# Patient Record
Sex: MALE
[De-identification: names, ages, dates, MRNs, and addresses within clinical notes are randomized per-mention and may not be internally consistent; named-entity substitution may affect disease eponyms.]

---

## 2018-04-03 ENCOUNTER — HOSPITAL ENCOUNTER (OUTPATIENT)
Dept: HOSPITAL 56 - MW.ED | Age: 38
Setting detail: OBSERVATION
LOS: 2 days | Discharge: HOME | End: 2018-04-05
Attending: INTERNAL MEDICINE | Admitting: INTERNAL MEDICINE
Payer: COMMERCIAL

## 2018-04-03 DIAGNOSIS — Z79.899: ICD-10-CM

## 2018-04-03 DIAGNOSIS — R07.9: ICD-10-CM

## 2018-04-03 DIAGNOSIS — Z79.01: ICD-10-CM

## 2018-04-03 DIAGNOSIS — Z87.891: ICD-10-CM

## 2018-04-03 DIAGNOSIS — I26.99: Primary | ICD-10-CM

## 2018-04-03 DIAGNOSIS — Z79.82: ICD-10-CM

## 2018-04-03 PROCEDURE — 96365 THER/PROPH/DIAG IV INF INIT: CPT

## 2018-04-03 PROCEDURE — G0378 HOSPITAL OBSERVATION PER HR: HCPCS

## 2018-04-03 PROCEDURE — 96375 TX/PRO/DX INJ NEW DRUG ADDON: CPT

## 2018-04-03 PROCEDURE — 85610 PROTHROMBIN TIME: CPT

## 2018-04-03 PROCEDURE — 80048 BASIC METABOLIC PNL TOTAL CA: CPT

## 2018-04-03 PROCEDURE — 36415 COLL VENOUS BLD VENIPUNCTURE: CPT

## 2018-04-03 PROCEDURE — 84484 ASSAY OF TROPONIN QUANT: CPT

## 2018-04-03 PROCEDURE — 80053 COMPREHEN METABOLIC PANEL: CPT

## 2018-04-03 PROCEDURE — 85025 COMPLETE CBC W/AUTO DIFF WBC: CPT

## 2018-04-03 PROCEDURE — 96376 TX/PRO/DX INJ SAME DRUG ADON: CPT

## 2018-04-03 PROCEDURE — 93005 ELECTROCARDIOGRAM TRACING: CPT

## 2018-04-03 PROCEDURE — 99285 EMERGENCY DEPT VISIT HI MDM: CPT

## 2018-04-03 PROCEDURE — 85730 THROMBOPLASTIN TIME PARTIAL: CPT

## 2018-04-03 PROCEDURE — 71275 CT ANGIOGRAPHY CHEST: CPT

## 2018-04-04 LAB
CHLORIDE SERPL-SCNC: 103 MMOL/L (ref 98–107)
CHLORIDE SERPL-SCNC: 103 MMOL/L (ref 98–107)
SODIUM SERPL-SCNC: 138 MMOL/L (ref 136–148)
SODIUM SERPL-SCNC: 138 MMOL/L (ref 136–148)

## 2018-04-04 RX ADMIN — HEPARIN SODIUM SCH MLS/HR: 5000 INJECTION, SOLUTION INTRAVENOUS at 03:21

## 2018-04-04 RX ADMIN — HEPARIN SODIUM SCH MLS/HR: 5000 INJECTION, SOLUTION INTRAVENOUS at 13:33

## 2018-04-04 RX ADMIN — ENOXAPARIN SODIUM SCH MG: 150 INJECTION SUBCUTANEOUS at 20:23

## 2018-04-04 NOTE — EDM.PDOC
ED HPI GENERAL MEDICAL PROBLEM





- General


Chief Complaint: Respiratory Problem


Stated Complaint: SHORTNESS OF BREATH


Time Seen by Provider: 04/03/18 23:56





- History of Present Illness


INITIAL COMMENTS - FREE TEXT/NARRATIVE: 





HISTORY AND PHYSICAL:





History of present illness:


The patient is a 39 y/o male with a history of tobacco use but quit 8 months 

ago and presents with complaints of a stabbing like pain in his lateral and 

posterior left chest wall area that started approximately 24 hours ago. The 

patient had recent surgery on his left tibia in [place] on March 10, 2018 and 

says that from that perspective he is doing well with very minimal leg pain and 

he has not noticed any incredible swelling to the left leg or calf tenderness. 

The patient says he has been increasing his activities and doing more and since 

yesterday evening he was having this intermittent stabbing like pain in his 

lateral and posterior left rib cage area. He was not short of breath and he had 

no cough fever chills nausea or vomiting. He does not have any flank pain or 

urinary complaints. He says that it was intermittent and seemed to initially 

only occur with certain movements or deep breaths but now he saying that it is 

been more consistent and every time he takes a deep breath he feels it and it 

is becoming more constant not just with certain movements. He has been eating 

and drinking normally and he has not taken anything extra for pain regarding 

this. He is concerned about a blood clot due to his surgery which is why he's 

here. He says he doesn't feel particularly short of breath but is more 

discomfort and he does not feel like he can take a deep breath.





Review of systems: 


As per history of present illness and below otherwise all systems reviewed and 

negative.





Past medical history: 


As per history of present illness and as reviewed below otherwise 

noncontributory.





Surgical history: 


As per history of present illness and as reviewed below otherwise 

noncontributory.





Social history: 


No reported history of drug or alcohol abuse.





Family history: 


As per history of present illness and as reviewed below otherwise 

noncontributory.





Physical exam:


General: Well-developed well-nourished man who is nontoxic and vital signs are 

noted by me. He is not breathless on my evaluation he is not diaphoretic and 

his heart rate is normal on my personal evaluation.


HEENT: Atraumatic, normocephalic, pupils reactive, negative for conjunctival 

pallor or scleral icterus, mucous membranes moist, throat clear, neck supple, 

nontender, trachea midline.


Lungs: Clear to auscultation, breath sounds equal bilaterally, chest nontender. 

There is no discrete area of tenderness crepitus defects or deformities and I 

cannot elicit the pain on palpation. There is no work of breathing wheezing or 

stridor


Heart: S1S2, regular rate and rhythm no overt tremors on my evaluation


Abdomen: Soft, nondistended, nontender. Negative for masses or 

hepatosplenomegaly. Negative for costovertebral tenderness.


Pelvis: Stable nontender.


Genitourinary: Deferred.


Rectal: Deferred.


Extremities: Atraumatic, negative for cords or calf pain. Neurovascular 

unremarkable. The patient has well-healed scars near his inferior left knee and 

the left calf is slightly larger than the right but there is no discrete calf 

tenderness or  erythema


Neuro: Awake, alert, oriented. Cranial nerves II through XII unremarkable. 

Cerebellum unremarkable. Motor and sensory unremarkable throughout. Exam 

nonfocal.





Diagnostics:


CBC CMP EKG troponin CTA of the chest





Therapeutics:


Oxygen as needed Toradol IV placement heparin per protocol





0300: Testing results were discussed with the patient, family at bedside, and 

our hospitalist Dr. Juan. Due to the bilateral PE we will start a heparin drip 

per protocol with a heparin bolus per Dr. Juan's request. Patient says his 

pain is manageable at this point and does not want further pain treatment. His 

oxygen level is stable but he is placed on low-dose oxygen for comfort. Patient 

continues not to be tachypnic or tachycardic. We'll plan on observation 

admission for Dr. Juan's request





Impression: 


Bilateral PE with possible small left lower lobe pulmonary infarct, history of 

recent tibial surgery stable





Definitive disposition and diagnosis as appropriate pending reevaluation and 

review of above.


  ** left rib area


Pain Score (Numeric/FACES): 6





- Related Data


 Allergies











Allergy/AdvReac Type Severity Reaction Status Date / Time


 


No Known Allergies Allergy   Verified 04/03/18 23:53











Home Meds: 


 Home Meds





Aspirin 325 mg PO DAILY 04/03/18 [History]


Hydrocodone/Acetaminophen [Hydrocodon-Acetaminophn ] 1 tab PO ASDIRECTED 

PRN 04/03/18 [History]











Past Medical History


HEENT History: Reports: None


Cardiovascular History: Reports: None


Respiratory History: Reports: None


Gastrointestinal History: Reports: None


Genitourinary History: Reports: None


Musculoskeletal History: Reports: None


Neurological History: Reports: None


Psychiatric History: Reports: None


Endocrine/Metabolic History: Reports: None


Hematologic History: Reports: None


Immunologic History: Reports: None


Oncologic (Cancer) History: Reports: None


Dermatologic History: Reports: None





- Infectious Disease History


Infectious Disease History: Reports: Chicken Pox





- Past Surgical History


Musculoskeletal Surgical History: Reports: ORIF





Social & Family History





- Family History


Family Medical History: Noncontributory





- Tobacco Use


Smoking Status *Q: Former Smoker


Used Tobacco, but Quit: No





- Caffeine Use


Caffeine Use: Reports: Coffee, Soda





- Recreational Drug Use


Recreational Drug Use: No





ED ROS GENERAL





- Review of Systems


Review Of Systems: ROS reveals no pertinent complaints other than HPI.





ED EXAM, GENERAL





- Physical Exam


Exam: See Below (See dictation)





Course





- Vital Signs


Last Recorded V/S: 


 Last Vital Signs











Temp  36.5 C   04/03/18 23:55


 


Pulse  94   04/04/18 02:43


 


Resp  20   04/04/18 02:43


 


BP  105/66   04/04/18 02:43


 


Pulse Ox  98   04/04/18 02:43














- Orders/Labs/Meds


Orders: 


 Active Orders 24 hr











 Category Date Time Status


 


 Patient Status [ADT] Stat ADT  04/04/18 03:06 Ordered


 


 EKG Documentation Completion [RC] STAT Care  04/04/18 00:08 Active


 


 Oxygen Therapy, ED [RC] ASDIRECTED Care  04/04/18 00:08 Active


 


 Pulse Oximetry [RC] ASDIRECTED Care  04/04/18 00:08 Active


 


 Ang Chest [CT] Stat Exams  04/04/18 00:08 Taken


 


 Heparin Sod,Pork In 0.45% Nacl [Heparin-1/2Ns 25,000 Med  04/04/18 03:15 

Ordered





 Units/500]   





 25,000 unit in 500 ml IV TITRATE   


 


 Heparin Sodium Med  04/04/18 03:04 Once





 5,000 units IVPUSH .BOLUS ONE   


 


 Sodium Chloride 0.9% [Saline Flush] Med  04/04/18 00:08 Active





 10 ml FLUSH ASDIRECTED PRN   


 


 Sodium Chloride 0.9% [Saline Flush] Med  04/04/18 00:08 Active





 2.5 ml FLUSH ASDIRECTED PRN   


 


 Saline Lock Insert [OM.PC] Stat Oth  04/04/18 00:08 Ordered








 Medication Orders





Sodium Chloride (Saline Flush)  10 ml FLUSH ASDIRECTED PRN


   PRN Reason: Keep Vein Open


   Last Admin: 04/04/18 00:36  Dose: 10 ml


Sodium Chloride (Saline Flush)  2.5 ml FLUSH ASDIRECTED PRN


   PRN Reason: Keep Vein Open


   Last Admin: 04/04/18 00:35  Dose: 2.5 ml








Labs: 


 Laboratory Tests











  04/04/18 04/04/18 Range/Units





  00:30 00:30 


 


WBC  13.18 H   (4.0-11.0)  K/uL


 


RBC  4.67   (4.50-5.90)  M/uL


 


Hgb  13.9   (13.0-17.0)  g/dL


 


Hct  39.6   (38.0-50.0)  %


 


MCV  84.8   (80.0-98.0)  fL


 


MCH  29.8   (27.0-32.0)  pg


 


MCHC  35.1   (31.0-37.0)  g/dL


 


RDW Std Deviation  37.5   (28.0-62.0)  fl


 


RDW Coeff of Jairon  12   (11.0-15.0)  %


 


Plt Count  334   (150-400)  K/uL


 


MPV  9.00   (7.40-12.00)  fL


 


Neut % (Auto)  76.8   (48.0-80.0)  %


 


Lymph % (Auto)  14.0 L   (16.0-40.0)  %


 


Mono % (Auto)  7.7   (0.0-15.0)  %


 


Eos % (Auto)  1.3   (0.0-7.0)  %


 


Baso % (Auto)  0.2   (0.0-1.5)  %


 


Neut # (Auto)  10.1 H   (1.4-5.7)  K/uL


 


Lymph # (Auto)  1.8   (0.6-2.4)  K/uL


 


Mono # (Auto)  1.0 H   (0.0-0.8)  K/uL


 


Eos # (Auto)  0.2   (0.0-0.7)  K/uL


 


Baso # (Auto)  0.0   (0.0-0.1)  K/uL


 


Nucleated RBC %  0.0   /100WBC


 


Nucleated RBCs #  0   K/uL


 


Sodium   138  (136-148)  mmol/L


 


Potassium   4.5  (3.5-5.1)  mmol/L


 


Chloride   103  ()  mmol/L


 


Carbon Dioxide   29.4  (21.0-32.0)  mmol/L


 


BUN   19 H  (7.0-18.0)  mg/dL


 


Creatinine   1.1  (0.8-1.3)  mg/dL


 


Est Cr Clr Drug Dosing   110.96  mL/min


 


Estimated GFR (MDRD)   > 60.0  ml/min


 


Glucose   101  ()  mg/dL


 


Calcium   9.4  (8.5-10.1)  mg/dL


 


Total Bilirubin   0.6  (0.2-1.0)  mg/dL


 


AST   12 L  (15-37)  IU/L


 


ALT   13 L  (14-63)  IU/L


 


Alkaline Phosphatase   143 H  ()  U/L


 


Troponin I   < 0.050  (0.000-0.056)  ng/mL


 


Total Protein   7.1  (6.4-8.2)  g/dL


 


Albumin   3.5  (3.4-5.0)  g/dL


 


Globulin   3.6 H  (2.0-3.5)  g/dL


 


Albumin/Globulin Ratio   1.0 L  (1.3-2.8)  











Meds: 


Medications











Generic Name Dose Route Start Last Admin





  Trade Name Freq  PRN Reason Stop Dose Admin


 


Sodium Chloride  10 ml  04/04/18 00:08  04/04/18 00:36





  Saline Flush  FLUSH   10 ml





  ASDIRECTED PRN   Administration





  Keep Vein Open   





     





     





     


 


Sodium Chloride  2.5 ml  04/04/18 00:08  04/04/18 00:35





  Saline Flush  FLUSH   2.5 ml





  ASDIRECTED PRN   Administration





  Keep Vein Open   





     





     





     














Discontinued Medications














Generic Name Dose Route Start Last Admin





  Trade Name Freq  PRN Reason Stop Dose Admin


 


Iopamidol  50 ml  04/04/18 02:20  04/04/18 02:21





  Isovue-370 (76%)  IV  04/04/18 02:21  50 ml





  ONETIME ONE   Administration





     





     





     





     


 


Ketorolac Tromethamine  30 mg  04/04/18 00:09  04/04/18 00:41





  Toradol  IVPUSH  04/04/18 00:10  30 mg





  ONETIME ONE   Administration





     





     





     





     














Departure





- Departure


Time of Disposition: 03:09


Disposition: Refer to Observation


Condition: Good


Clinical Impression: 


Pulmonary embolus


Qualifiers:


 Pulmonary embolism type: other Chronicity: acute Acute cor pulmonale presence: 

without acute cor pulmonale Qualified Code(s): I26.99 - Other pulmonary 

embolism without acute cor pulmonale








- Discharge Information


Referrals: 


PCP,None [Primary Care Provider] - 


Forms:  ED Department Discharge





- My Orders


Last 24 Hours: 


My Active Orders





04/04/18 00:08


EKG Documentation Completion [RC] STAT 


Oxygen Therapy, ED [RC] ASDIRECTED 


Pulse Oximetry [RC] ASDIRECTED 


Ang Chest [CT] Stat 


Sodium Chloride 0.9% [Saline Flush]   10 ml FLUSH ASDIRECTED PRN 


Sodium Chloride 0.9% [Saline Flush]   2.5 ml FLUSH ASDIRECTED PRN 


Saline Lock Insert [OM.PC] Stat 





04/04/18 03:04


Heparin Sodium   5,000 units IVPUSH .BOLUS ONE 





04/04/18 03:06


Patient Status [ADT] Stat 





04/04/18 03:15


Heparin Sod,Pork In 0.45% Nacl [Heparin-1/2Ns 25,000 Units/500] 25,000 unit in 

500 ml IV TITRATE 














- Assessment/Plan


Last 24 Hours: 


My Active Orders





04/04/18 00:08


EKG Documentation Completion [RC] STAT 


Oxygen Therapy, ED [RC] ASDIRECTED 


Pulse Oximetry [RC] ASDIRECTED 


Ang Chest [CT] Stat 


Sodium Chloride 0.9% [Saline Flush]   10 ml FLUSH ASDIRECTED PRN 


Sodium Chloride 0.9% [Saline Flush]   2.5 ml FLUSH ASDIRECTED PRN 


Saline Lock Insert [OM.PC] Stat 





04/04/18 03:04


Heparin Sodium   5,000 units IVPUSH .BOLUS ONE 





04/04/18 03:06


Patient Status [ADT] Stat 





04/04/18 03:15


Heparin Sod,Pork In 0.45% Nacl [Heparin-1/2Ns 25,000 Units/500] 25,000 unit in 

500 ml IV TITRATE

## 2018-04-04 NOTE — CT
EXAM DATE: 18



PATIENT'S AGE: 38





Patient: PAULIE DIAZ



Facility: [place], [place]

Patient ID: [id number]

Site Patient ID: [id number].

Site Accession #: [id number].

: 1980

Study: CT Chest Angio [id number]/4/2018 2:25:31 AM

Ordering Physician: Celestina Knutson



Final Report: 

INDICATION:

Left chest pain, recent surgery 



TECHNIQUE:

CT chest pulmonary PE protocol acquired with 50 cc Isovue 370 IV contrast.



COMPARISON:

None 



FINDINGS:

Cardiovascular structures: There are pulmonary emboli within the segmental and 
subsegmental branches of the bilateral lower lobes and segmental branches of 
the right upper lobe. No evidence for right heart strain. Heart size is normal. 
No sign of aneurysm in the thoracic aorta. 

Mediastinum and tenzin: No mass or adenopathy. 

Lungs: There is some peripheral, pleural-based airspace opacity in the left 
lower lobe. 

Pleura and pericardium: Small left pleural effusion. 

Chest wall and axilla: No mass or adenopathy. 

Upper abdomen: Unremarkable. 

Bones: No significant findings. 



IMPRESSION:

Bilateral pulmonary emboli. No evidence of right heart strain. Possible left 
lower lobe pulmonary infarction. Small left pleural effusion. These findings 
were discussed with Dr. Oscar at 2:50 a.m. on 2018.



Please note that all CT scans at this facility use dose modulation, iterative 
reconstruction, and/or weight-based dosing when appropriate to reduce radiation 
dose to as low as reasonably achievable.



Dictated by Autumn Baker MD @ 2018 2:39AM

(Electronic Signature)



Report Signed by Proxy.
DAVID

## 2018-04-04 NOTE — PCM.HP
H&P History of Present Illness





- General


Date of Service: 04/04/18


Admit Problem/Dx: 


 Admission Diagnosis/Problem





Admission Diagnosis/Problem      Pulmonary embolism











- History of Present Illness


Initial Comments - Free Text/Narative: 


38-year-old male presenting secondary to pulmonary embolism. Patient 3 weeks 

prior had a left tib-fib surgical intervention after traumatic patient did not 

have any complications with the surgery, patient did well in terms of recovery 

after the surgery, his pain has been adequately controlled and he no longer 

required narcotics about a week and a half after his surgery was finished. 

Patient had a surgical intervention in [place]. He recently about one week 

prior came to [place]. He did not have any issues in terms of shortness of 

breath any signs or symptoms of clotting or calf pain throughout this process. 

Patient states that yesterday he even went walking around in [facility] and did 

not have any concerns during the afternoon however towards the evening he 

started to have right-sided calf pain in particular on flexion of his heel. 

Patient subsequently started to develop shortness of breath and left-sided 

anterior chest pain radiating to the back. Patient's shortness of breath became 

progressively worse and he came into the ER where he was subsequently found to 

have a PE. Patient denies any significant past medical history, denies any 

medication aside from that which was prescribed to him by orthopedic surgery 

upon discharge for pain control, patient denies any family history or any 

personal history of any coagulopathy.





  ** left rib area


Pain Score (Numeric/FACES): 5





- Related Data


Allergies/Adverse Reactions: 


 Allergies











Allergy/AdvReac Type Severity Reaction Status Date / Time


 


No Known Allergies Allergy   Verified 04/03/18 23:53











Home Medications: 


 Home Meds





Aspirin 325 mg PO DAILY 04/03/18 [History]


Hydrocodone/Acetaminophen [Hydrocodon-Acetaminophn ] 1 tab PO ASDIRECTED 

PRN 04/03/18 [History]


Enoxaparin [Lovenox] 130 mg SUBCUT Q12H 5 Days #10 syringe 04/04/18 [Rx]











Past Medical History


HEENT History: Reports: None


Cardiovascular History: Reports: None


Respiratory History: Reports: None


Gastrointestinal History: Reports: None


Genitourinary History: Reports: None


Musculoskeletal History: Reports: None


Neurological History: Reports: None


Psychiatric History: Reports: None


Endocrine/Metabolic History: Reports: None


Hematologic History: Reports: None


Immunologic History: Reports: None


Oncologic (Cancer) History: Reports: None


Dermatologic History: Reports: None





- Infectious Disease History


Infectious Disease History: Reports: Chicken Pox





- Past Surgical History


Musculoskeletal Surgical History: Reports: Amputation, ORIF, Other (See Below)


Other Musculoskeletal Surgeries/Procedures:: s/p surgery on the left Tibia in 

[place] on Mar 10.  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





Social & Family History





- Family History


Family Medical History: Noncontributory





- Tobacco Use


Smoking Status *Q: Former Smoker


Years of Tobacco use: 20


Packs/Tins Daily: 1


Used Tobacco, but Quit: Yes


Month/Year Tobacco Last Used: 8 mos ago


Second Hand Smoke Exposure: No





- Caffeine Use


Caffeine Use: Reports: Soda





- Alcohol Use


Days Per Week of Alcohol Use: 0





- Recreational Drug Use


Recreational Drug Use: No





H&P Review of Systems





- Review of Systems:


Review Of Systems: ROS reveals no pertinent complaints other than HPI.





Exam





- Exam


Exam: See Below





- Vital Signs


Vital Signs: 


 Last Vital Signs











Temp  36.9 C   04/04/18 08:00


 


Pulse  96   04/04/18 08:00


 


Resp  20   04/04/18 08:00


 


BP  103/54 L  04/04/18 08:00


 


Pulse Ox  96   04/04/18 08:00











Weight: 131.6 kg





- Exam


General: Alert, Oriented, Cooperative, Mild Distress


Neck: Supple


Lungs: Clear to Auscultation, Decreased Breath Sounds


Cardiovascular: Regular Rate, Regular Rhythm


GI/Abdominal Exam: Normal Bowel Sounds, Soft, Non-Tender


Extremities: Normal Inspection, Non-Tender, No Pedal Edema





- Patient Data


Lab Results Last 24 hrs: 


 Laboratory Results - last 24 hr











  04/04/18 04/04/18 04/04/18 Range/Units





  00:30 00:30 00:30 


 


WBC  13.18 H    (4.0-11.0)  K/uL


 


RBC  4.67    (4.50-5.90)  M/uL


 


Hgb  13.9    (13.0-17.0)  g/dL


 


Hct  39.6    (38.0-50.0)  %


 


MCV  84.8    (80.0-98.0)  fL


 


MCH  29.8    (27.0-32.0)  pg


 


MCHC  35.1    (31.0-37.0)  g/dL


 


RDW Std Deviation  37.5    (28.0-62.0)  fl


 


RDW Coeff of Jairon  12    (11.0-15.0)  %


 


Plt Count  334    (150-400)  K/uL


 


MPV  9.00    (7.40-12.00)  fL


 


Neut % (Auto)  76.8    (48.0-80.0)  %


 


Lymph % (Auto)  14.0 L    (16.0-40.0)  %


 


Mono % (Auto)  7.7    (0.0-15.0)  %


 


Eos % (Auto)  1.3    (0.0-7.0)  %


 


Baso % (Auto)  0.2    (0.0-1.5)  %


 


Neut # (Auto)  10.1 H    (1.4-5.7)  K/uL


 


Lymph # (Auto)  1.8    (0.6-2.4)  K/uL


 


Mono # (Auto)  1.0 H    (0.0-0.8)  K/uL


 


Eos # (Auto)  0.2    (0.0-0.7)  K/uL


 


Baso # (Auto)  0.0    (0.0-0.1)  K/uL


 


Nucleated RBC %  0.0    /100WBC


 


Nucleated RBCs #  0    K/uL


 


INR     


 


APTT    28.0  (18.6-31.3)  SEC


 


Sodium   138   (136-148)  mmol/L


 


Potassium   4.5   (3.5-5.1)  mmol/L


 


Chloride   103   ()  mmol/L


 


Carbon Dioxide   29.4   (21.0-32.0)  mmol/L


 


BUN   19 H   (7.0-18.0)  mg/dL


 


Creatinine   1.1   (0.8-1.3)  mg/dL


 


Est Cr Clr Drug Dosing   110.96   mL/min


 


Estimated GFR (MDRD)   > 60.0   ml/min


 


Glucose   101   ()  mg/dL


 


Calcium   9.4   (8.5-10.1)  mg/dL


 


Total Bilirubin   0.6   (0.2-1.0)  mg/dL


 


AST   12 L   (15-37)  IU/L


 


ALT   13 L   (14-63)  IU/L


 


Alkaline Phosphatase   143 H   ()  U/L


 


Troponin I   < 0.050   (0.000-0.056)  ng/mL


 


Total Protein   7.1   (6.4-8.2)  g/dL


 


Albumin   3.5   (3.4-5.0)  g/dL


 


Globulin   3.6 H   (2.0-3.5)  g/dL


 


Albumin/Globulin Ratio   1.0 L   (1.3-2.8)  














  04/04/18 04/04/18 04/04/18 Range/Units





  00:30 04:40 04:40 


 


WBC   12.54 H   (4.0-11.0)  K/uL


 


RBC   4.44 L   (4.50-5.90)  M/uL


 


Hgb   13.2   (13.0-17.0)  g/dL


 


Hct   37.9 L   (38.0-50.0)  %


 


MCV   85.4   (80.0-98.0)  fL


 


MCH   29.7   (27.0-32.0)  pg


 


MCHC   34.8   (31.0-37.0)  g/dL


 


RDW Std Deviation   37.6   (28.0-62.0)  fl


 


RDW Coeff of Jairon   12   (11.0-15.0)  %


 


Plt Count   334   (150-400)  K/uL


 


MPV   9.20   (7.40-12.00)  fL


 


Neut % (Auto)   67.5   (48.0-80.0)  %


 


Lymph % (Auto)   21.5   (16.0-40.0)  %


 


Mono % (Auto)   9.2   (0.0-15.0)  %


 


Eos % (Auto)   1.4   (0.0-7.0)  %


 


Baso % (Auto)   0.4   (0.0-1.5)  %


 


Neut # (Auto)   8.5 H   (1.4-5.7)  K/uL


 


Lymph # (Auto)   2.7 H   (0.6-2.4)  K/uL


 


Mono # (Auto)   1.2 H   (0.0-0.8)  K/uL


 


Eos # (Auto)   0.2   (0.0-0.7)  K/uL


 


Baso # (Auto)   0.1   (0.0-0.1)  K/uL


 


Nucleated RBC %   0.0   /100WBC


 


Nucleated RBCs #   0   K/uL


 


INR  1.18    


 


APTT     (18.6-31.3)  SEC


 


Sodium    138  (136-148)  mmol/L


 


Potassium    4.0  (3.5-5.1)  mmol/L


 


Chloride    103  ()  mmol/L


 


Carbon Dioxide    26.8  (21.0-32.0)  mmol/L


 


BUN    20 H  (7.0-18.0)  mg/dL


 


Creatinine    1.1  (0.8-1.3)  mg/dL


 


Est Cr Clr Drug Dosing    110.96  mL/min


 


Estimated GFR (MDRD)    > 60.0  ml/min


 


Glucose    109 H  ()  mg/dL


 


Calcium    9.5  (8.5-10.1)  mg/dL


 


Total Bilirubin     (0.2-1.0)  mg/dL


 


AST     (15-37)  IU/L


 


ALT     (14-63)  IU/L


 


Alkaline Phosphatase     ()  U/L


 


Troponin I     (0.000-0.056)  ng/mL


 


Total Protein     (6.4-8.2)  g/dL


 


Albumin     (3.4-5.0)  g/dL


 


Globulin     (2.0-3.5)  g/dL


 


Albumin/Globulin Ratio     (1.3-2.8)  











Result Diagrams: 


 04/04/18 04:40





 04/04/18 04:40


Problem List Initiated/Reviewed/Updated: Yes


Orders Last 24hrs: 


 Active Orders 24 hr











 Category Date Time Status


 


 Patient Status [ADT] Stat ADT  04/04/18 03:06 Active


 


 Overnight Pulse Oximetry [] Click to Edit Care  04/04/18 03:57 Active


 


 Oxygen Therapy, ED [] ASDIRECTED Care  04/04/18 00:08 Active


 


 Pulse Oximetry [RC] ASDIRECTED Care  04/04/18 00:08 Active


 


 Telemetry Monitoring [Cardiac Monitoring] [] Q8H Care  04/04/18 04:44 Active


 


 Regular Diet [DIET] Diet  04/04/18 Breakfast Active


 


 PTT,PARTIAL THROMBOPLSTIN TIME [COAG] Q6 Lab  04/04/18 09:31 Received


 


 PTT,PARTIAL THROMBOPLSTIN TIME [COAG] Q6 Lab  04/04/18 15:20 Ordered


 


 PTT,PARTIAL THROMBOPLSTIN TIME [COAG] Q6 Lab  04/04/18 21:20 Ordered


 


 PTT,PARTIAL THROMBOPLSTIN TIME [COAG] Q6 Lab  04/05/18 03:20 Ordered


 


 PTT,PARTIAL THROMBOPLSTIN TIME [COAG] Q6 Lab  04/05/18 09:20 Ordered


 


 PTT,PARTIAL THROMBOPLSTIN TIME [COAG] Q6H Lab  04/05/18 15:20 Ordered


 


 Heparin Sod,Pork In 0.45% Nacl [Heparin-1/2Ns 25,000 Med  04/04/18 03:15 Active





 Units/500]   





 25,000 unit in 500 ml IV TITRATE   


 


 Sodium Chloride 0.9% [Saline Flush] Med  04/04/18 00:08 Active





 10 ml FLUSH ASDIRECTED PRN   


 


 Sodium Chloride 0.9% [Saline Flush] Med  04/04/18 00:08 Active





 2.5 ml FLUSH ASDIRECTED PRN   


 


 oxyCODONE Med  04/04/18 03:57 Active





 5 mg PO Q4H PRN   


 


 Pulse Oximetry Continuous Monitoring [OM.PC] Routine Oth  04/04/18 03:57 

Ordered


 


 Saline Lock Insert [OM.PC] Stat Oth  04/04/18 00:08 Ordered








 Medication Orders





Heparin Sod,Pork In 0.45% Nacl (Heparin-1/2ns 25,000 Units/500)  25,000 unit in 

500 mls @ 48.988 mls/hr IV TITRATE FLORENCIO; Protocol


   Last Admin: 04/04/18 03:21  Dose: 18 units/kg/hr, 48.988 mls/hr


Oxycodone HCl (Oxycodone)  5 mg PO Q4H PRN


   PRN Reason: Pain


   Last Admin: 04/04/18 08:39  Dose: 5 mg


   Admin: 04/04/18 04:23  Dose: 5 mg


Sodium Chloride (Saline Flush)  10 ml FLUSH ASDIRECTED PRN


   PRN Reason: Keep Vein Open


   Last Admin: 04/04/18 00:36  Dose: 10 ml


Sodium Chloride (Saline Flush)  2.5 ml FLUSH ASDIRECTED PRN


   PRN Reason: Keep Vein Open


   Last Admin: 04/04/18 00:35  Dose: 2.5 ml








Assessment/Plan Comment:: 


This is a 38-year-old male presenting with shortness of breath and calf pain 

secondary to a pulmonary embolism suffered likely associated with the recent 

surgery the patient had about 3 weeks prior for a repair of his left tib-fib.





Patient shall require anticoagulation therapy, after speaking with the patient 

extensively patient is to get Coumadin for which she will be bridged with 

Lovenox 1 mg/kg twice a day for a minimum of 5 days or up until his INR is 

therapeutic.





Continue to observe patient status to ensure that the patient has not further 

worsening secondary to his PE, ensure that the patient does not have any 

worsening right heart strain resulting congestive heart failure, ensure the 

patient is not having worsening shortness of breath or increased oxygen 

requirement as these could be signs of worsening PE that would require further 

intervention.





Patient is admitted for observation and expected length of stay of stay is less 

than 2 midnights.

## 2018-04-05 LAB
CHLORIDE SERPL-SCNC: 103 MMOL/L (ref 98–107)
SODIUM SERPL-SCNC: 138 MMOL/L (ref 136–148)

## 2018-04-05 RX ADMIN — ENOXAPARIN SODIUM SCH MG: 150 INJECTION SUBCUTANEOUS at 08:24

## 2018-04-05 NOTE — [email or web address]
<Claudio Galindo Z - Last Filed: 04/05/18 18:32>





**Discharge Summary





- Hospital Course


HPI Initial Comments: 


Discharge diagnosis:





#1.  bilateral lateral pulmonary embolism without any right heart strain


#2.  Left-sided chest pain radiating to the back secondary to pulmonary





Hospitalization course: Patient was admitted after CT Marisa was done 

indicating bilateral pulmonary embolisms, patient was initially started on 

heparin drip to ensure adequate anticoagulation, as well as given pain 

management to help control pain secondary to the bilateral pulmonary embolism. 

A discussion was had with the patient on day 2 of admission in regards to 

trying the newer generation anticoagulants however due to the fact that the 

patient was a self-pay and was not able to afford the new generation 

anticoagulants it was decided the patient would go on Coumadin. Patient was 

initially started with a Coumadin dose of 10 mg and he was being bridged with 

Lovenox weight-based dose of 130 mg twice a day. On day of discharge patient's 

INR was 1.25, he was given another dose of 10 mg of warfarin, AM dose of 

Lovenox and was subsequently discharged home on warfarin 7.5 mg, Lovenox 

prescription, oxycodone for 4 days and instructions to follow-up with Dr. Galindo on Monday for INR recheck.











- Discharge Data


Discharge Date: 04/05/18


Discharge Disposition: Home, Self-Care 01


Condition: Fair





- Patient Instructions


Diet: Usual Diet as Tolerated


Activity: As Tolerated


Driving: May Drive Today, Do Not Drive


Showering/Bathing: May Shower


Notify Provider of: Fever, Increased Pain, Swelling and Redness, Drainage, 

Nausea and/or Vomiting





- Discharge Plan


Prescriptions/Med Rec: 


oxyCODONE 5 mg PO Q6HR PRN 4 Days #16 tablet


 PRN Reason: Pain


Enoxaparin [Lovenox] 130 mg SUBCUT Q12H 5 Days #10 syringe


Warfarin [Coumadin] 5 mg PO DAILY 30 Days #100 tab


Home Medications: 


 Home Meds





Enoxaparin [Lovenox] 130 mg SUBCUT Q12H 5 Days #10 syringe 04/04/18 [Rx]


Warfarin [Coumadin] 5 mg PO DAILY 30 Days #100 tab 04/05/18 [Rx]


oxyCODONE 5 mg PO Q6HR PRN 4 Days #16 tablet 04/05/18 [Rx]








Patient Handouts:  Vitamin K Foods and Warfarin, Enoxaparin injection, Warfarin 

tablets, Pulmonary Embolism, How and Where to Give Subcutaneous Enoxaparin 

Injections


Forms:  ED Department Discharge


Referrals: 


Claudio Galindo MD [Resident] - 04/09/18 3:30 pm (Pls get follow-up appointment 

next week.)


PCP,None [Primary Care Provider] - 





- Discharge Summary/Plan Comment


DC Time >30 min.: No





- Patient Data


Vitals - Most Recent: 


 Last Vital Signs











Temp  35.8 C   04/05/18 08:00


 


Pulse  95   04/05/18 04:00


 


Resp  18   04/05/18 08:00


 


BP  131/67   04/05/18 08:00


 


Pulse Ox  94 L  04/05/18 08:00











Weight - Most Recent: 131.6 kg


I&O - Last 24 hours: 


 Intake & Output











 04/04/18 04/05/18 04/05/18





 22:59 06:59 14:59


 


Intake Total 910 900 


 


Output Total 400 1000 


 


Balance 510 -100 











Lab Results - Last 24 hrs: 


 Laboratory Results - last 24 hr











  04/04/18 04/04/18 04/04/18 Range/Units





  09:31 12:43 15:17 


 


WBC     (4.0-11.0)  K/uL


 


RBC     (4.50-5.90)  M/uL


 


Hgb     (13.0-17.0)  g/dL


 


Hct     (38.0-50.0)  %


 


MCV     (80.0-98.0)  fL


 


MCH     (27.0-32.0)  pg


 


MCHC     (31.0-37.0)  g/dL


 


RDW Std Deviation     (28.0-62.0)  fl


 


RDW Coeff of Jairon     (11.0-15.0)  %


 


Plt Count     (150-400)  K/uL


 


MPV     (7.40-12.00)  fL


 


Neut % (Auto)     (48.0-80.0)  %


 


Lymph % (Auto)     (16.0-40.0)  %


 


Mono % (Auto)     (0.0-15.0)  %


 


Eos % (Auto)     (0.0-7.0)  %


 


Baso % (Auto)     (0.0-1.5)  %


 


Neut # (Auto)     (1.4-5.7)  K/uL


 


Lymph # (Auto)     (0.6-2.4)  K/uL


 


Mono # (Auto)     (0.0-0.8)  K/uL


 


Eos # (Auto)     (0.0-0.7)  K/uL


 


Baso # (Auto)     (0.0-0.1)  K/uL


 


Nucleated RBC %     /100WBC


 


Nucleated RBCs #     K/uL


 


INR   1.21   


 


APTT  62.3 H   48.8 H  (18.6-31.3)  SEC


 


Sodium     (136-148)  mmol/L


 


Potassium     (3.5-5.1)  mmol/L


 


Chloride     ()  mmol/L


 


Carbon Dioxide     (21.0-32.0)  mmol/L


 


BUN     (7.0-18.0)  mg/dL


 


Creatinine     (0.8-1.3)  mg/dL


 


Est Cr Clr Drug Dosing     mL/min


 


Estimated GFR (MDRD)     ml/min


 


Glucose     ()  mg/dL


 


Calcium     (8.5-10.1)  mg/dL


 


Total Bilirubin     (0.2-1.0)  mg/dL


 


AST     (15-37)  IU/L


 


ALT     (14-63)  IU/L


 


Alkaline Phosphatase     ()  U/L


 


Total Protein     (6.4-8.2)  g/dL


 


Albumin     (3.4-5.0)  g/dL


 


Globulin     (2.0-3.5)  g/dL


 


Albumin/Globulin Ratio     (1.3-2.8)  














  04/05/18 04/05/18 04/05/18 Range/Units





  04:45 04:45 04:45 


 


WBC  10.53    (4.0-11.0)  K/uL


 


RBC  4.39 L    (4.50-5.90)  M/uL


 


Hgb  13.0    (13.0-17.0)  g/dL


 


Hct  37.6 L    (38.0-50.0)  %


 


MCV  85.6    (80.0-98.0)  fL


 


MCH  29.6    (27.0-32.0)  pg


 


MCHC  34.6    (31.0-37.0)  g/dL


 


RDW Std Deviation  37.7    (28.0-62.0)  fl


 


RDW Coeff of Jairon  12    (11.0-15.0)  %


 


Plt Count  319    (150-400)  K/uL


 


MPV  9.40    (7.40-12.00)  fL


 


Neut % (Auto)  63.4    (48.0-80.0)  %


 


Lymph % (Auto)  23.9    (16.0-40.0)  %


 


Mono % (Auto)  11.0    (0.0-15.0)  %


 


Eos % (Auto)  1.4    (0.0-7.0)  %


 


Baso % (Auto)  0.3    (0.0-1.5)  %


 


Neut # (Auto)  6.7 H    (1.4-5.7)  K/uL


 


Lymph # (Auto)  2.5 H    (0.6-2.4)  K/uL


 


Mono # (Auto)  1.2 H    (0.0-0.8)  K/uL


 


Eos # (Auto)  0.2    (0.0-0.7)  K/uL


 


Baso # (Auto)  0.0    (0.0-0.1)  K/uL


 


Nucleated RBC %  0.0    /100WBC


 


Nucleated RBCs #  0    K/uL


 


INR   1.25   


 


APTT     (18.6-31.3)  SEC


 


Sodium    138  (136-148)  mmol/L


 


Potassium    4.6  (3.5-5.1)  mmol/L


 


Chloride    103  ()  mmol/L


 


Carbon Dioxide    30.0  (21.0-32.0)  mmol/L


 


BUN    20 H  (7.0-18.0)  mg/dL


 


Creatinine    1.1  (0.8-1.3)  mg/dL


 


Est Cr Clr Drug Dosing    110.96  mL/min


 


Estimated GFR (MDRD)    > 60.0  ml/min


 


Glucose    109 H  ()  mg/dL


 


Calcium    9.2  (8.5-10.1)  mg/dL


 


Total Bilirubin    0.9  (0.2-1.0)  mg/dL


 


AST    10 L  (15-37)  IU/L


 


ALT    13 L  (14-63)  IU/L


 


Alkaline Phosphatase    134 H  ()  U/L


 


Total Protein    7.0  (6.4-8.2)  g/dL


 


Albumin    3.0 L  (3.4-5.0)  g/dL


 


Globulin    4.0 H  (2.0-3.5)  g/dL


 


Albumin/Globulin Ratio    0.8 L  (1.3-2.8)  














  04/05/18 Range/Units





  04:45 


 


WBC   (4.0-11.0)  K/uL


 


RBC   (4.50-5.90)  M/uL


 


Hgb   (13.0-17.0)  g/dL


 


Hct   (38.0-50.0)  %


 


MCV   (80.0-98.0)  fL


 


MCH   (27.0-32.0)  pg


 


MCHC   (31.0-37.0)  g/dL


 


RDW Std Deviation   (28.0-62.0)  fl


 


RDW Coeff of Jairon   (11.0-15.0)  %


 


Plt Count   (150-400)  K/uL


 


MPV   (7.40-12.00)  fL


 


Neut % (Auto)   (48.0-80.0)  %


 


Lymph % (Auto)   (16.0-40.0)  %


 


Mono % (Auto)   (0.0-15.0)  %


 


Eos % (Auto)   (0.0-7.0)  %


 


Baso % (Auto)   (0.0-1.5)  %


 


Neut # (Auto)   (1.4-5.7)  K/uL


 


Lymph # (Auto)   (0.6-2.4)  K/uL


 


Mono # (Auto)   (0.0-0.8)  K/uL


 


Eos # (Auto)   (0.0-0.7)  K/uL


 


Baso # (Auto)   (0.0-0.1)  K/uL


 


Nucleated RBC %   /100WBC


 


Nucleated RBCs #   K/uL


 


INR   


 


APTT  33.5 H  (18.6-31.3)  SEC


 


Sodium   (136-148)  mmol/L


 


Potassium   (3.5-5.1)  mmol/L


 


Chloride   ()  mmol/L


 


Carbon Dioxide   (21.0-32.0)  mmol/L


 


BUN   (7.0-18.0)  mg/dL


 


Creatinine   (0.8-1.3)  mg/dL


 


Est Cr Clr Drug Dosing   mL/min


 


Estimated GFR (MDRD)   ml/min


 


Glucose   ()  mg/dL


 


Calcium   (8.5-10.1)  mg/dL


 


Total Bilirubin   (0.2-1.0)  mg/dL


 


AST   (15-37)  IU/L


 


ALT   (14-63)  IU/L


 


Alkaline Phosphatase   ()  U/L


 


Total Protein   (6.4-8.2)  g/dL


 


Albumin   (3.4-5.0)  g/dL


 


Globulin   (2.0-3.5)  g/dL


 


Albumin/Globulin Ratio   (1.3-2.8)  











Med Orders - Current: 


 Current Medications





Enoxaparin Sodium (Lovenox)  130 mg SUBCUT Q12H FLORENCIO


   Last Admin: 04/05/18 08:24 Dose:  130 mg


Oxycodone HCl (Oxycodone)  5 mg PO Q4H PRN


   PRN Reason: Pain


   Last Admin: 04/05/18 01:35 Dose:  5 mg


Sodium Chloride (Saline Flush)  10 ml FLUSH ASDIRECTED PRN


   PRN Reason: Keep Vein Open


   Last Admin: 04/04/18 00:36 Dose:  10 ml


Sodium Chloride (Saline Flush)  2.5 ml FLUSH ASDIRECTED PRN


   PRN Reason: Keep Vein Open


   Last Admin: 04/04/18 00:35 Dose:  2.5 ml


Warfarin Sodium (Coumadin Ask)  1 each PO DAILY@1400 FLORENCIO


   Last Admin: 04/04/18 15:27 Dose:  Not Given





Discontinued Medications





Enoxaparin Sodium (Lovenox)  100 mg SUBCUT BID [facility]


Heparin Sodium (Porcine) (Heparin Sodium)  5,000 units IVPUSH .BOLUS ONE; 

Protocol


   Stop: 04/04/18 03:05


   Last Admin: 04/04/18 03:19 Dose:  5,000 units


Heparin Sod,Pork In 0.45% Nacl (Heparin-1/2ns 25,000 Units/500)  25,000 unit in 

500 mls @ 48.988 mls/hr IV TITRATE FLORENCIO; Protocol


   Stop: 04/04/18 19:59


   Last Titration: 04/04/18 15:46 Dose:  20 units/kg/hr, 54.431 mls/hr


Heparin Sod,Pork In 0.45% Nacl (Heparin-1/2ns 25,000 Units/500) Confirm 

Administered Dose 25,000 unit in 500 mls @ as directed IV .STK-MED ONE


   Stop: 04/04/18 03:13


   Last Admin: 04/04/18 03:22 Dose:  Not Given


Iopamidol (Isovue-370 (76%))  50 ml IV ONETIME ONE


   Stop: 04/04/18 02:21


   Last Admin: 04/04/18 02:21 Dose:  50 ml


Ketorolac Tromethamine (Toradol)  30 mg IVPUSH ONETIME ONE


   Stop: 04/04/18 00:10


   Last Admin: 04/04/18 00:41 Dose:  30 mg


Warfarin Sodium (Coumadin)  10 mg PO 04/04/18@1400 [facility]


   Stop: 04/04/18 14:01


   Last Admin: 04/04/18 13:28 Dose:  10 mg











<JuanSivakumar J - Last Filed: 04/13/18 19:50>





- Patient Data


Vitals - Most Recent: 


 Last Vital Signs











Temp  35.8 C   04/05/18 08:00


 


Pulse  95   04/05/18 04:00


 


Resp  18   04/05/18 08:00


 


BP  131/67   04/05/18 08:00


 


Pulse Ox  94 L  04/05/18 08:00











Med Orders - Current: 


 Current Medications








Discontinued Medications





Enoxaparin Sodium (Lovenox)  130 mg SUBCUT Q12H [facility]ENC[facility]


   Last Admin: 04/05/18 08:24 Dose:  130 mg


Enoxaparin Sodium (Lovenox)  100 mg SUBCUT BID [facility]ENC[facility]


Heparin Sodium (Porcine) (Heparin Sodium)  5,000 units IVPUSH .BOLUS ONE; 

Protocol


   Stop: 04/04/18 03:05


   Last Admin: 04/04/18 03:19 Dose:  5,000 units


Heparin Sod,Pork In 0.45% Nacl (Heparin-1/2ns 25,000 Units/500)  25,000 unit in 

500 mls @ 48.988 mls/hr IV TITRATE FLORENCIO; Protocol


   Stop: 04/04/18 19:59


   Last Titration: 04/04/18 15:46 Dose:  20 units/kg/hr, 54.431 mls/hr


Heparin Sod,Pork In 0.45% Nacl (Heparin-1/2ns 25,000 Units/500) Confirm 

Administered Dose 25,000 unit in 500 mls @ as directed IV .STK-MED ONE


   Stop: 04/04/18 03:13


   Last Admin: 04/04/18 03:22 Dose:  Not Given


Iopamidol (Isovue-370 (76%))  50 ml IV ONETIME ONE


   Stop: 04/04/18 02:21


   Last Admin: 04/04/18 02:21 Dose:  50 ml


Ketorolac Tromethamine (Toradol)  30 mg IVPUSH ONETIME ONE


   Stop: 04/04/18 00:10


   Last Admin: 04/04/18 00:41 Dose:  30 mg


Oxycodone HCl (Oxycodone)  5 mg PO Q4H PRN


   PRN Reason: Pain


   Last Admin: 04/05/18 11:37 Dose:  5 mg


Sodium Chloride (Saline Flush)  10 ml FLUSH ASDIRECTED PRN


   PRN Reason: Keep Vein Open


   Last Admin: 04/04/18 00:36 Dose:  10 ml


Sodium Chloride (Saline Flush)  2.5 ml FLUSH ASDIRECTED PRN


   PRN Reason: Keep Vein Open


   Last Admin: 04/04/18 00:35 Dose:  2.5 ml


Warfarin Sodium (Coumadin)  10 mg PO 04/04/18@1400 FL[facility]ENCIO


   Stop: 04/04/18 14:01


   Last Admin: 04/04/18 13:28 Dose:  10 mg


Warfarin Sodium (Coumadin Ask)  1 each PO DAILY@1400 FLORENCIO


   Last Admin: 04/04/18 15:27 Dose:  Not Given


Warfarin Sodium (Coumadin)  10 mg PO ONETIME ONE


   Stop: 04/05/18 11:11


   Last Admin: 04/05/18 11:35 Dose:  10 mg











- Free Text/Narrative


Note: 





I have examined the patient.  I have discussed findings and treatment plan with 

the resident.  I agree with the assessment and plan outlined in the following 

resident's note.